# Patient Record
Sex: MALE | Race: OTHER | Employment: OTHER | URBAN - METROPOLITAN AREA
[De-identification: names, ages, dates, MRNs, and addresses within clinical notes are randomized per-mention and may not be internally consistent; named-entity substitution may affect disease eponyms.]

---

## 2018-06-14 ENCOUNTER — CONSULTATION (OUTPATIENT)
Dept: URBAN - METROPOLITAN AREA CLINIC 11 | Facility: CLINIC | Age: 78
End: 2018-06-14

## 2018-06-14 VITALS — DIASTOLIC BLOOD PRESSURE: 118 MMHG | HEIGHT: 60 IN | SYSTOLIC BLOOD PRESSURE: 178 MMHG

## 2018-06-14 ASSESSMENT — VISUAL ACUITY
OS_PH: 20/30-2
OD_CC: 20/50
OS_CC: 20/40-1

## 2018-06-14 ASSESSMENT — TONOMETRY
OD_IOP_MMHG: 23
OS_IOP_MMHG: 23

## 2018-06-14 NOTE — PATIENT DISCUSSION
Greater than 50% of exam spent in dialogue with patient. He is cleared for cataract surgery in both eyes. I would like to see him about a month following surgery to re-evaluate.

## 2018-06-14 NOTE — PATIENT DISCUSSION
Cataract surgery might improve enough that the patient is functional to the point where the macular pucker surgery is not necessary.

## 2018-10-19 ENCOUNTER — FOLLOW UP (OUTPATIENT)
Age: 78
End: 2018-10-19

## 2018-10-22 ASSESSMENT — TONOMETRY
OS_IOP_MMHG: 21
OD_IOP_MMHG: 19

## 2018-10-22 ASSESSMENT — VISUAL ACUITY
OD_SC: 20/25-2
OS_SC: 20/25+2